# Patient Record
Sex: MALE | Race: WHITE | NOT HISPANIC OR LATINO | Employment: OTHER | URBAN - METROPOLITAN AREA
[De-identification: names, ages, dates, MRNs, and addresses within clinical notes are randomized per-mention and may not be internally consistent; named-entity substitution may affect disease eponyms.]

---

## 2017-03-06 NOTE — PATIENT DISCUSSION
Ectropion Counseling: The nature of the diagnosis and associated symptoms were discussed. The patient understands that there is a risk of corneal exposure, redness, dryness and even scarring from chronic exposure. Artificial tears, lubricating gel or ointment are recommended for the protection of the ocular surface.  Return for follow-up as scheduled or sooner if symptoms worsen

## 2017-03-06 NOTE — PATIENT DISCUSSION
HYDROXYCHLOROQUINE (PLAQUENIL) USE: NO OCULAR TOXICITY OU. CONTINUE PLAQUENIL AS DIRECTED. SCHEDULE YEARLY VF 10-2 RED,OCT MACULA, AND ASSESMENT OF COLOR PLATES WITH DILATED FUNDUS EXAM. KEEP FOLLOW UP AS SCHEDULED.

## 2017-03-06 NOTE — PATIENT DISCUSSION
Hydroxychloroquine (Plaquenil) Counseling: I have discussed the ophthalmic risks of chronic hydroxychloroquine therapy, including but not limited to abnormal color vision, decreased central vision, and difficulty adjusting to darkness. The patient is encouraged to call back with any visual disturbance, and to keep follow-up appointments as scheduled.

## 2017-03-06 NOTE — PATIENT DISCUSSION
MATURE CATARACTS, OU - VISUALLY SIGNIFICANT. DISCUSSED R/B/A TO SURGERY WITH PATIENT AND DAUGHTER.  DECLINE INTERVENTION AT THIS TIME

## 2017-03-06 NOTE — PATIENT DISCUSSION
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 PT DECLINES ANY SURGICAL INTERVENTION AT THIS TIME.  CONSIDER LL PLUGS AND RESTASIS IF SYMPTOMS PERSIST

## 2017-07-19 NOTE — PROCEDURE NOTE: CLINICAL
PROCEDURE NOTE: Focal Laser, Retina OS. Diagnosis: Diabetic Macular Edema. Prior to focal laser, risks/benefits/alternatives to laser discussed including loss of vision and patient wished to proceed. An informed consent was obtained and no assurances or guarantees were given. Spot size: 50 um. Power: 50 mW. Number of pulses: 5. Pulse duration: 50 ms. Procedure Time: 142PM. Patient tolerated procedure well. There were no complications. Post procedure instructions given. Patient given office phone number/answering service number and advised to call immediately should there be loss of vision or pain, or should they have any other questions or concerns. Perfecto Dale

## 2017-08-01 NOTE — PROCEDURE NOTE: CLINICAL
PROCEDURE NOTE: Eylea 2mg #1 OS. Diagnosis: Moderate Nonproliferative Diabetic Retinopathy. Anesthesia: Akten Gel 3.5%. Prep: Betadine Drops. Prior to injection, risks/benefits/alternatives discussed including infection, loss of vision, hemorrhage, cataract, glaucoma, retinal tears or detachment. The patient wished to proceed with treatment. Betadine prep was performed. Topical anesthesia was induced with Alcaine. Additional anesthesia was achieved using drop(s) or injection checked above. A drop of Povidone-iodine 5% ophthalmic solution was instilled over the injection site and in the inferior fornix. Using the syringe provided, Eylea 2.0mg in 0.05 cc was injected into the vitreous cavity. The remainder of the Eylea in the single-use vial was then discarded in a medical waste disposal container. The needle was passed 3.0 mm posterior to the limbus in pseudophakic patients, and 3.5 mm posterior to the limbus in phakic patients. Patient tolerated procedure well. There were no complications. Injection time: 3:30 PM. The eye was irrigated with sterile irrigating solution. Post procedure instructions given. The patient was instructed to return for re-evaluation in approximately 4-12 weeks depending on his/her condition and was told to call immediately if vision decreases and/or if his/her eye becomes red, painful, and/or light sensitive. The patient was instructed to go to the emergency room or call 911 if unable to reach the doctor within an hour or two of trying or calling. The patient was instructed to use Artificial Tears q.i.d. p.r.n for comfort. Leni Schwartz

## 2017-09-26 NOTE — PROCEDURE NOTE: CLINICAL
PROCEDURE NOTE: Eylea 2mg OS. Diagnosis: Diabetic Macular Edema. Anesthesia: Topical. Prep: Betadine Drops. Prior to injection, risks/benefits/alternatives discussed including infection, loss of vision, hemorrhage, cataract, glaucoma, retinal tears or detachment. The patient wished to proceed with treatment. Betadine prep was performed. Topical anesthesia was induced with Alcaine. Additional anesthesia was achieved using drop(s) or injection checked above. A drop of Povidone-iodine 5% ophthalmic solution was instilled over the injection site and in the inferior fornix. Using the syringe provided, Eylea 2.0mg in 0.05 cc was injected into the vitreous cavity. The remainder of the Eylea in the single-use vial was then discarded in a medical waste disposal container. The needle was passed 3.0 mm posterior to the limbus in pseudophakic patients, and 3.5 mm posterior to the limbus in phakic patients. Patient tolerated procedure well. There were no complications. Injection time: 3:45 PM. The eye was irrigated with sterile irrigating solution. Post procedure instructions given. The patient was instructed to return for re-evaluation in approximately 4-12 weeks depending on his/her condition and was told to call immediately if vision decreases and/or if his/her eye becomes red, painful, and/or light sensitive. The patient was instructed to go to the emergency room or call 911 if unable to reach the doctor within an hour or two of trying or calling. The patient was instructed to use Artificial Tears q.i.d. p.r.n for comfort. Idania Faith

## 2017-11-21 NOTE — PROCEDURE NOTE: CLINICAL
PROCEDURE NOTE: Focal Laser, Retina OS. Diagnosis: Moderate Nonproliferative Diabetic Retinopathy. Anesthesia: Topical. Prior to focal laser, risks/benefits/alternatives to laser discussed including loss of vision and patient wished to proceed. An informed consent was obtained and no assurances or guarantees were given. Spot size: 50 um. Power: 80 mW. Number of pulses: 5. Pulse duration:  ms.  Procedure Time: 315. Patient tolerated procedure well. There were no complications. Post procedure instructions given. Patient given office phone number/answering service number and advised to call immediately should there be loss of vision or pain, or should they have any other questions or concerns. Bina Galloway

## 2017-11-21 NOTE — PATIENT DISCUSSION
FOCAL - NO IMPROVEMENT WITH EYLEA - RECOM RETX WITH LASER AND TO HOLD ON FURTHER EYLEA/ANTIVEGF - AS VA 20/20 AND PT PHAKIC TO HOLD ON STEROIDS.

## 2018-04-04 NOTE — PATIENT DISCUSSION
ON 22 32 27 - FOCAL - NO IMPROVEMENT WITH EYLEA - RECOM RETX WITH LASER AND TO HOLD ON FURTHER EYLEA/ANTIVEGF - AS VA 20/20 AND PT PHAKIC TO HOLD ON STEROIDS.

## 2018-08-31 NOTE — PATIENT DISCUSSION
BLEPHARITIS, OU: PRESCRIBE WARM COMPRESSES AND EYELID SCRUBS QD-BID, ARTIFICIAL TEARS BID-QID, THE DAILY INTAKE OF OMEGA-3 FATTY ACIDS AND EMYCIN KYE QHS .

## 2018-08-31 NOTE — PATIENT DISCUSSION
New Prescription: erythromycin (erythromycin): ointment: 5 mg/gram (0.5 %) 1 a thin layer at bedtime into affected eye 08-

## 2018-10-03 NOTE — PROCEDURE NOTE: CLINICAL
PROCEDURE NOTE: Focal Laser, Retina OS. Diagnosis: Diabetic Macular Edema. Anesthesia: Topical. Prior to focal laser, risks/benefits/alternatives to laser discussed including loss of vision and patient wished to proceed. An informed consent was obtained and no assurances or guarantees were given. Spot size: 50 um. Power: 70 mW. Number of pulses: 6. Pulse duration: 50 ms. Procedure Time: 3:55. Patient tolerated procedure well. There were no complications. Post procedure instructions given. Patient given office phone number/answering service number and advised to call immediately should there be loss of vision or pain, or should they have any other questions or concerns. [facility]

## 2018-10-10 NOTE — PROCEDURE NOTE: CLINICAL
PROCEDURE NOTE: Eylea 2mg OS. Diagnosis: Diabetic Macular Edema. Anesthesia: Topical. Prep: Betadine Drops. Prior to injection, risks/benefits/alternatives discussed including infection, loss of vision, hemorrhage, cataract, glaucoma, retinal tears or detachment. The patient wished to proceed with treatment. Betadine prep was performed. Topical anesthesia was induced with Alcaine. Additional anesthesia was achieved using drop(s) or injection checked above. A drop of Povidone-iodine 5% ophthalmic solution was instilled over the injection site and in the inferior fornix. Using the syringe provided, Eylea 2.0mg in 0.05 cc was injected into the vitreous cavity. The remainder of the Eylea in the single-use vial was then discarded in a medical waste disposal container. The needle was passed 3.0 mm posterior to the limbus in pseudophakic patients, and 3.5 mm posterior to the limbus in phakic patients. Patient tolerated procedure well. There were no complications. Injection time: *. The eye was irrigated with sterile irrigating solution. Post procedure instructions given. The patient was instructed to return for re-evaluation in approximately 4-12 weeks depending on his/her condition and was told to call immediately if vision decreases and/or if his/her eye becomes red, painful, and/or light sensitive. The patient was instructed to go to the emergency room or call 911 if unable to reach the doctor within an hour or two of trying or calling. The patient was instructed to use Artificial Tears q.i.d. p.r.n for comfort. [facility]

## 2019-11-07 ENCOUNTER — NEW PATIENT (OUTPATIENT)
Dept: URBAN - METROPOLITAN AREA CLINIC 43 | Facility: CLINIC | Age: 84
End: 2019-11-07

## 2019-11-07 DIAGNOSIS — Z96.1: ICD-10-CM

## 2019-11-07 DIAGNOSIS — H40.1131: ICD-10-CM

## 2019-11-07 DIAGNOSIS — H04.123: ICD-10-CM

## 2019-11-07 DIAGNOSIS — H35.3124: ICD-10-CM

## 2019-11-07 DIAGNOSIS — H35.3112: ICD-10-CM

## 2019-11-07 PROCEDURE — 92250 FUNDUS PHOTOGRAPHY W/I&R: CPT

## 2019-11-07 PROCEDURE — 92134 CPTRZ OPH DX IMG PST SGM RTA: CPT

## 2019-11-07 PROCEDURE — 99214 OFFICE O/P EST MOD 30 MIN: CPT

## 2019-11-07 PROCEDURE — 92015 DETERMINE REFRACTIVE STATE: CPT

## 2019-11-07 PROCEDURE — 9222550 BILAT EXTENDED OPHTHALMOSCOPY, FIRST

## 2019-11-07 ASSESSMENT — VISUAL ACUITY
OD_CC: 20/60+2
OS_CC: 20/200
OS_CC: J8
OS_SC: 20/200
OD_SC: J3
OS_SC: J10
OD_SC: 20/80+1
OD_CC: J1-

## 2019-11-07 ASSESSMENT — TONOMETRY
OS_IOP_MMHG: 17
OD_IOP_MMHG: 16

## 2019-11-08 ENCOUNTER — TECH ONLY (OUTPATIENT)
Dept: URBAN - METROPOLITAN AREA CLINIC 43 | Facility: CLINIC | Age: 84
End: 2019-11-08

## 2019-11-08 DIAGNOSIS — H40.1131: ICD-10-CM

## 2019-11-08 PROCEDURE — 92083 EXTENDED VISUAL FIELD XM: CPT

## 2019-11-08 PROCEDURE — 99211T TECH SERVICE

## 2020-03-12 ENCOUNTER — IOP CHECK (OUTPATIENT)
Dept: URBAN - METROPOLITAN AREA CLINIC 43 | Facility: CLINIC | Age: 85
End: 2020-03-12

## 2020-03-12 DIAGNOSIS — H40.1131: ICD-10-CM

## 2020-03-12 PROCEDURE — 92083 EXTENDED VISUAL FIELD XM: CPT

## 2020-03-12 PROCEDURE — 92012 INTRM OPH EXAM EST PATIENT: CPT

## 2020-03-12 RX ORDER — NETARSUDIL AND LATANOPROST OPHTHALMIC SOLUTION, 0.02%/0.005% .2; .05 MG/ML; MG/ML
1 SOLUTION/ DROPS OPHTHALMIC; TOPICAL EVERY EVENING
Start: 2020-03-12

## 2020-03-12 ASSESSMENT — TONOMETRY
OD_IOP_MMHG: 18
OS_IOP_MMHG: 22

## 2020-03-12 ASSESSMENT — VISUAL ACUITY
OS_CC: 20/200 EF
OD_CC: 20/30-2

## 2022-04-13 ENCOUNTER — COMPREHENSIVE EXAM (OUTPATIENT)
Dept: URBAN - METROPOLITAN AREA CLINIC 43 | Facility: CLINIC | Age: 87
End: 2022-04-13

## 2022-04-13 DIAGNOSIS — H40.1131: ICD-10-CM

## 2022-04-13 DIAGNOSIS — Z79.899: ICD-10-CM

## 2022-04-13 DIAGNOSIS — Z96.1: ICD-10-CM

## 2022-04-13 DIAGNOSIS — H35.3112: ICD-10-CM

## 2022-04-13 DIAGNOSIS — H35.3124: ICD-10-CM

## 2022-04-13 DIAGNOSIS — H04.123: ICD-10-CM

## 2022-04-13 PROCEDURE — 92134 CPTRZ OPH DX IMG PST SGM RTA: CPT

## 2022-04-13 PROCEDURE — 92014 COMPRE OPH EXAM EST PT 1/>: CPT

## 2022-04-13 PROCEDURE — 92133 CPTRZD OPH DX IMG PST SGM ON: CPT

## 2022-04-13 PROCEDURE — 92015 DETERMINE REFRACTIVE STATE: CPT

## 2022-04-13 ASSESSMENT — VISUAL ACUITY
OD_SC: J10
OS_CC: CF 5FT
OS_SC: <J12
OS_SC: CF 5FT
OS_CC: <J12
OD_SC: CF 5FT
OD_CC: J4
OD_CC: 20/80

## 2022-04-13 ASSESSMENT — TONOMETRY
OS_IOP_MMHG: 13
OD_IOP_MMHG: 13

## 2022-11-22 ENCOUNTER — ESTABLISHED PATIENT (OUTPATIENT)
Dept: URBAN - METROPOLITAN AREA CLINIC 43 | Facility: CLINIC | Age: 87
End: 2022-11-22

## 2022-11-22 DIAGNOSIS — H04.123: ICD-10-CM

## 2022-11-22 DIAGNOSIS — Z79.899: ICD-10-CM

## 2022-11-22 DIAGNOSIS — H35.3112: ICD-10-CM

## 2022-11-22 DIAGNOSIS — H40.1131: ICD-10-CM

## 2022-11-22 DIAGNOSIS — H35.3124: ICD-10-CM

## 2022-11-22 PROCEDURE — 92014 COMPRE OPH EXAM EST PT 1/>: CPT

## 2022-11-22 PROCEDURE — 92134 CPTRZ OPH DX IMG PST SGM RTA: CPT

## 2022-11-22 ASSESSMENT — TONOMETRY
OD_IOP_MMHG: 10
OS_IOP_MMHG: 12

## 2022-11-22 ASSESSMENT — VISUAL ACUITY
OD_CC: 20/60+2
OS_CC: 20/150-1

## 2023-04-13 ENCOUNTER — ESTABLISHED PATIENT (OUTPATIENT)
Dept: URBAN - METROPOLITAN AREA CLINIC 43 | Facility: CLINIC | Age: 88
End: 2023-04-13

## 2023-04-13 DIAGNOSIS — H35.3112: ICD-10-CM

## 2023-04-13 DIAGNOSIS — H40.1111: ICD-10-CM

## 2023-04-13 DIAGNOSIS — H02.105: ICD-10-CM

## 2023-04-13 DIAGNOSIS — H35.3124: ICD-10-CM

## 2023-04-13 DIAGNOSIS — Z79.899: ICD-10-CM

## 2023-04-13 DIAGNOSIS — H04.123: ICD-10-CM

## 2023-04-13 DIAGNOSIS — Z98.890: ICD-10-CM

## 2023-04-13 DIAGNOSIS — H40.1122: ICD-10-CM

## 2023-04-13 PROCEDURE — 92012 INTRM OPH EXAM EST PATIENT: CPT

## 2023-04-13 ASSESSMENT — TONOMETRY
OS_IOP_MMHG: 12
OD_IOP_MMHG: 10

## 2023-04-13 ASSESSMENT — VISUAL ACUITY
OS_CC: 20/400 EF
OD_CC: 20/70
OS_PH: 20/100